# Patient Record
Sex: FEMALE | Race: WHITE | NOT HISPANIC OR LATINO | Employment: FULL TIME | URBAN - NONMETROPOLITAN AREA
[De-identification: names, ages, dates, MRNs, and addresses within clinical notes are randomized per-mention and may not be internally consistent; named-entity substitution may affect disease eponyms.]

---

## 2018-04-30 ENCOUNTER — HOSPITAL ENCOUNTER (EMERGENCY)
Facility: HOSPITAL | Age: 28
Discharge: HOME | End: 2018-04-30
Attending: EMERGENCY MEDICINE | Admitting: EMERGENCY MEDICINE

## 2018-04-30 ENCOUNTER — APPOINTMENT (OUTPATIENT)
Dept: ULTRASOUND IMAGING | Facility: HOSPITAL | Age: 28
End: 2018-04-30

## 2018-04-30 VITALS
OXYGEN SATURATION: 97 % | HEART RATE: 71 BPM | WEIGHT: 133.8 LBS | SYSTOLIC BLOOD PRESSURE: 104 MMHG | DIASTOLIC BLOOD PRESSURE: 55 MMHG | BODY MASS INDEX: 24.62 KG/M2 | TEMPERATURE: 98.6 F | RESPIRATION RATE: 16 BRPM | HEIGHT: 62 IN

## 2018-04-30 DIAGNOSIS — Z32.01 PREGNANCY TEST POSITIVE: ICD-10-CM

## 2018-04-30 DIAGNOSIS — N92.3 VAGINAL BLEEDING BETWEEN PERIODS: Primary | ICD-10-CM

## 2018-04-30 LAB
ABO GROUP BLD: NORMAL
ANION GAP SERPL CALCULATED.3IONS-SCNC: 18 MMOL/L (ref 10–20)
B-HCG UR QL: NEGATIVE
BACTERIA UR QL AUTO: ABNORMAL /HPF
BASOPHILS # BLD AUTO: 0.08 10*3/MM3 (ref 0–0.2)
BASOPHILS NFR BLD AUTO: 1.1 % (ref 0–2.5)
BILIRUB UR QL STRIP: NEGATIVE
BUN BLD-MCNC: 9 MG/DL (ref 7–20)
BUN/CREAT SERPL: 15 (ref 7.1–23.5)
CALCIUM SPEC-SCNC: 9.2 MG/DL (ref 8.4–10.2)
CHLORIDE SERPL-SCNC: 104 MMOL/L (ref 98–107)
CLARITY UR: CLEAR
CO2 SERPL-SCNC: 26 MMOL/L (ref 26–30)
COLOR UR: YELLOW
CREAT BLD-MCNC: 0.6 MG/DL (ref 0.6–1.3)
DEPRECATED RDW RBC AUTO: 39.5 FL (ref 37–54)
EOSINOPHIL # BLD AUTO: 0.12 10*3/MM3 (ref 0–0.7)
EOSINOPHIL NFR BLD AUTO: 1.7 % (ref 0–7)
ERYTHROCYTE [DISTWIDTH] IN BLOOD BY AUTOMATED COUNT: 11.9 % (ref 11.5–14.5)
GFR SERPL CREATININE-BSD FRML MDRD: 119 ML/MIN/1.73
GLUCOSE BLD-MCNC: 109 MG/DL (ref 74–98)
GLUCOSE UR STRIP-MCNC: NEGATIVE MG/DL
HCG INTACT+B SERPL-ACNC: 28.38 MIU/ML
HCG SERPL QL: POSITIVE
HCT VFR BLD AUTO: 35.5 % (ref 37–47)
HGB BLD-MCNC: 11.9 G/DL (ref 12–16)
HGB UR QL STRIP.AUTO: ABNORMAL
HYALINE CASTS UR QL AUTO: ABNORMAL /LPF
IMM GRANULOCYTES # BLD: 0.02 10*3/MM3 (ref 0–0.06)
IMM GRANULOCYTES NFR BLD: 0.3 % (ref 0–0.6)
KETONES UR QL STRIP: NEGATIVE
LEUKOCYTE ESTERASE UR QL STRIP.AUTO: NEGATIVE
LYMPHOCYTES # BLD AUTO: 1.6 10*3/MM3 (ref 0.6–3.4)
LYMPHOCYTES NFR BLD AUTO: 22.6 % (ref 10–50)
MCH RBC QN AUTO: 30.4 PG (ref 27–31)
MCHC RBC AUTO-ENTMCNC: 33.5 G/DL (ref 30–37)
MCV RBC AUTO: 90.8 FL (ref 81–99)
MONOCYTES # BLD AUTO: 0.74 10*3/MM3 (ref 0–0.9)
MONOCYTES NFR BLD AUTO: 10.5 % (ref 0–12)
NEUTROPHILS # BLD AUTO: 4.51 10*3/MM3 (ref 2–6.9)
NEUTROPHILS NFR BLD AUTO: 63.8 % (ref 37–80)
NITRITE UR QL STRIP: NEGATIVE
NRBC BLD MANUAL-RTO: 0 /100 WBC (ref 0–0)
PH UR STRIP.AUTO: 6.5 [PH] (ref 5–8)
PLATELET # BLD AUTO: 261 10*3/MM3 (ref 130–400)
PMV BLD AUTO: 10.5 FL (ref 6–12)
POTASSIUM BLD-SCNC: 4 MMOL/L (ref 3.5–5.1)
PROT UR QL STRIP: NEGATIVE
RBC # BLD AUTO: 3.91 10*6/MM3 (ref 4.2–5.4)
RBC # UR: ABNORMAL /HPF
REF LAB TEST METHOD: ABNORMAL
RH BLD: NEGATIVE
SODIUM BLD-SCNC: 144 MMOL/L (ref 137–145)
SP GR UR STRIP: 1.01 (ref 1–1.03)
SQUAMOUS #/AREA URNS HPF: ABNORMAL /HPF
UROBILINOGEN UR QL STRIP: ABNORMAL
WBC NRBC COR # BLD: 7.07 10*3/MM3 (ref 4.8–10.8)
WBC UR QL AUTO: ABNORMAL /HPF

## 2018-04-30 PROCEDURE — 81001 URINALYSIS AUTO W/SCOPE: CPT | Performed by: EMERGENCY MEDICINE

## 2018-04-30 PROCEDURE — P9612 CATHETERIZE FOR URINE SPEC: HCPCS

## 2018-04-30 PROCEDURE — 96360 HYDRATION IV INFUSION INIT: CPT

## 2018-04-30 PROCEDURE — 99283 EMERGENCY DEPT VISIT LOW MDM: CPT

## 2018-04-30 PROCEDURE — 80048 BASIC METABOLIC PNL TOTAL CA: CPT | Performed by: PHYSICIAN ASSISTANT

## 2018-04-30 PROCEDURE — 81025 URINE PREGNANCY TEST: CPT | Performed by: EMERGENCY MEDICINE

## 2018-04-30 PROCEDURE — 76817 TRANSVAGINAL US OBSTETRIC: CPT

## 2018-04-30 PROCEDURE — 86900 BLOOD TYPING SEROLOGIC ABO: CPT | Performed by: PHYSICIAN ASSISTANT

## 2018-04-30 PROCEDURE — 84702 CHORIONIC GONADOTROPIN TEST: CPT | Performed by: PHYSICIAN ASSISTANT

## 2018-04-30 PROCEDURE — 84703 CHORIONIC GONADOTROPIN ASSAY: CPT | Performed by: PHYSICIAN ASSISTANT

## 2018-04-30 PROCEDURE — 85025 COMPLETE CBC W/AUTO DIFF WBC: CPT | Performed by: PHYSICIAN ASSISTANT

## 2018-04-30 PROCEDURE — 86901 BLOOD TYPING SEROLOGIC RH(D): CPT | Performed by: PHYSICIAN ASSISTANT

## 2018-04-30 RX ORDER — SODIUM CHLORIDE 0.9 % (FLUSH) 0.9 %
10 SYRINGE (ML) INJECTION AS NEEDED
Status: DISCONTINUED | OUTPATIENT
Start: 2018-04-30 | End: 2018-04-30 | Stop reason: HOSPADM

## 2018-04-30 RX ADMIN — SODIUM CHLORIDE 1000 ML: 9 INJECTION, SOLUTION INTRAVENOUS at 12:45

## 2018-05-02 ENCOUNTER — TRANSCRIBE ORDERS (OUTPATIENT)
Dept: ADMINISTRATIVE | Facility: HOSPITAL | Age: 28
End: 2018-05-02

## 2018-05-02 ENCOUNTER — APPOINTMENT (OUTPATIENT)
Dept: LAB | Facility: HOSPITAL | Age: 28
End: 2018-05-02

## 2018-05-02 DIAGNOSIS — N93.9 VAGINAL BLEEDING: Primary | ICD-10-CM

## 2018-05-02 LAB — HCG INTACT+B SERPL-ACNC: 18.14 MIU/ML

## 2018-05-02 PROCEDURE — 84702 CHORIONIC GONADOTROPIN TEST: CPT | Performed by: PHYSICIAN ASSISTANT

## 2018-05-02 PROCEDURE — 36415 COLL VENOUS BLD VENIPUNCTURE: CPT | Performed by: PHYSICIAN ASSISTANT

## 2018-05-03 ENCOUNTER — OFFICE VISIT (OUTPATIENT)
Dept: OBSTETRICS AND GYNECOLOGY | Facility: CLINIC | Age: 28
End: 2018-05-03

## 2018-05-03 VITALS
BODY MASS INDEX: 23.19 KG/M2 | SYSTOLIC BLOOD PRESSURE: 120 MMHG | DIASTOLIC BLOOD PRESSURE: 62 MMHG | HEIGHT: 62 IN | WEIGHT: 126 LBS

## 2018-05-03 DIAGNOSIS — Z32.01 POSITIVE PREGNANCY TEST: Primary | ICD-10-CM

## 2018-05-03 DIAGNOSIS — N93.9 ABNORMAL UTERINE BLEEDING (AUB): ICD-10-CM

## 2018-05-03 PROCEDURE — 99203 OFFICE O/P NEW LOW 30 MIN: CPT | Performed by: MIDWIFE

## 2018-05-03 RX ORDER — FERROUS SULFATE TAB EC 324 MG (65 MG FE EQUIVALENT) 324 (65 FE) MG
324 TABLET DELAYED RESPONSE ORAL
COMMUNITY

## 2018-05-03 NOTE — PROGRESS NOTES
"Subjective   Chief Complaint   Patient presents with   • Abnormal Vaginal Bleeding     seen in ED on 18 for vaginal bleeding after having + UPT, here to follow up, + blood on urine dip, negative UPT, had repeat HCG yesterday      Mary Christie is a 27 y.o. year old  presenting to be seen for F/U from ED.  She had a tubal 2015. She normally has monthly menses, but LMP was 3/18.  On  she had moderate amount of red bleeding and abdominal cramping and went to ED after + UPT at home.  HCG was 28 and then 18 yesterday. TVS showed no evidence of pregnancy.  Bleeding is now very light with no cramping.  She also had nausea associated with this.    History  Past Medical History:   Diagnosis Date   • Anemia    • Migraine    , Past Surgical History:   Procedure Laterality Date   • TUBAL ABDOMINAL LIGATION     , Family History   Problem Relation Age of Onset   • Hypertension Father    • Hyperlipidemia Father    • Stroke Father    • Thyroid disease Mother    • Asthma Mother    • Stroke Mother    • ADD / ADHD Brother    • Stroke Paternal Grandfather    • Stroke Paternal Grandmother    , Social History   Substance Use Topics   • Smoking status: Never Smoker   • Smokeless tobacco: Never Used   • Alcohol use No   ,   (Not in a hospital admission) and Allergies:  Latex    Smoking status: Never Smoker                                                              Smokeless tobacco: Never Used                          Review of Systems  Pertinent items are noted in HPI, all other systems reviewed and negative       Objective   /62   Ht 157.5 cm (62\")   Wt 57.2 kg (126 lb)   LMP 2018   Breastfeeding? No   BMI 23.05 kg/m²     Physical Exam:  General Appearance: alert, appears stated age and cooperative  Neck: suppple, trachea midline and no thyromegaly  Lungs: clear to auscultation, respirations regular, respirations even and respirations unlabored  Heart: regular rhythm and normal rate and no " murmur, gallop, or rubs  Abdomen: no masses and soft non-tender  Skin: color normal and no lesions noted  Neurologic: Mental Status orientated to person, place, time and situation, Speech normal content and execusion    Lab Review   reviewed from ED    Imaging   Reviewed from ED         Assessment /Plan    Mary was seen today for abnormal vaginal bleeding.    Diagnoses and all orders for this visit:    Positive pregnancy test  -     HCG, B-subunit, Quantitative; Future    Abnormal uterine bleeding (AUB)      May take Tylenol/Ibuprofen PRN  Follow up 1 week              This note was electronically signed.  Jeana Springer CNM  5/3/2018

## 2018-05-04 DIAGNOSIS — Z32.01 POSITIVE PREGNANCY TEST: ICD-10-CM

## 2018-05-04 LAB — HCG INTACT+B SERPL-ACNC: 12.01 MIU/ML

## 2018-05-10 DIAGNOSIS — O02.1 MISSED ABORTION: Primary | ICD-10-CM

## 2018-07-17 ENCOUNTER — OFFICE VISIT (OUTPATIENT)
Dept: OBSTETRICS AND GYNECOLOGY | Facility: CLINIC | Age: 28
End: 2018-07-17

## 2018-07-17 VITALS
DIASTOLIC BLOOD PRESSURE: 70 MMHG | HEIGHT: 62 IN | BODY MASS INDEX: 24.66 KG/M2 | WEIGHT: 134 LBS | SYSTOLIC BLOOD PRESSURE: 126 MMHG

## 2018-07-17 DIAGNOSIS — L70.9 ACNE, UNSPECIFIED ACNE TYPE: ICD-10-CM

## 2018-07-17 DIAGNOSIS — N93.9 ABNORMAL UTERINE BLEEDING (AUB): Primary | ICD-10-CM

## 2018-07-17 PROCEDURE — 99213 OFFICE O/P EST LOW 20 MIN: CPT | Performed by: MIDWIFE

## 2018-07-17 NOTE — PROGRESS NOTES
"Subjective   Chief Complaint   Patient presents with   • Amenorrhea     started period last Thursday, previous period was in April, had tubal 2015     Mary Christie is a 27 y.o. year old  presenting to be seen for abnormal uterine bleeding.  She was her in May for possible SAB and didn't have a period in May or .  She started bleeding  and it has almost stopped. This period has been heavy.  She also C/O acne for several months. This occurs throughout the month, not just around her menstrual cycle.  Her PCP seemed to think it was hormonally related.  She has had a tubal 2015.    History  Past Medical History:   Diagnosis Date   • Anemia    • Migraine    ,   Past Surgical History:   Procedure Laterality Date   • TUBAL ABDOMINAL LIGATION     ,   Family History   Problem Relation Age of Onset   • Hypertension Father    • Hyperlipidemia Father    • Stroke Father    • Thyroid disease Mother    • Asthma Mother    • Stroke Mother    • ADD / ADHD Brother    • Stroke Paternal Grandfather    • Stroke Paternal Grandmother    ,   Social History   Substance Use Topics   • Smoking status: Never Smoker   • Smokeless tobacco: Never Used   • Alcohol use No   ,   (Not in a hospital admission) and Allergies:  Latex    Smoking status: Never Smoker                                                              Smokeless tobacco: Never Used                          Review of Systems  Pertinent items are noted in HPI, all other systems reviewed and negative       Objective   /70   Ht 157.5 cm (62\")   Wt 60.8 kg (134 lb)   LMP 2018 (Approximate)   Breastfeeding? No   BMI 24.51 kg/m²     Physical Exam:  General Appearance: alert and cooperative  Skin: color normal and Acne on face, chest, upper arms  Neurologic: Mental Status orientated to person, place, time and situation, Speech normal content and execusion  Psych: normal mood and affect, thought content organized and appropriate " judgment    Lab Review   No data reviewed    Imaging   No data reviewed         Assessment /Plan    Mary was seen today for amenorrhea.    Diagnoses and all orders for this visit:    Abnormal uterine bleeding (AUB)    Acne, unspecified acne type      Will observe menstrual cycles for now. Advised if no menses for 3 months, return to office.  Discussed taking OCs for cycle control and possible improvement in acne.  Aloe Vera gel or topical creams for acne.  Follow up 3 months for annual exam           This note was electronically signed.  Jeana Springer CNM  7/17/2018

## 2018-10-29 ENCOUNTER — TELEPHONE (OUTPATIENT)
Dept: URGENT CARE | Facility: CLINIC | Age: 28
End: 2018-10-29

## 2018-11-05 ENCOUNTER — TRANSCRIBE ORDERS (OUTPATIENT)
Dept: ADMINISTRATIVE | Facility: HOSPITAL | Age: 28
End: 2018-11-05

## 2018-11-05 DIAGNOSIS — R51.9 NONINTRACTABLE HEADACHE, UNSPECIFIED CHRONICITY PATTERN, UNSPECIFIED HEADACHE TYPE: Primary | ICD-10-CM

## 2018-11-13 ENCOUNTER — HOSPITAL ENCOUNTER (OUTPATIENT)
Dept: MRI IMAGING | Facility: HOSPITAL | Age: 28
Discharge: HOME | End: 2018-11-13
Admitting: FAMILY MEDICINE

## 2018-11-13 DIAGNOSIS — R51.9 NONINTRACTABLE HEADACHE, UNSPECIFIED CHRONICITY PATTERN, UNSPECIFIED HEADACHE TYPE: ICD-10-CM

## 2018-11-13 PROCEDURE — 70551 MRI BRAIN STEM W/O DYE: CPT

## 2019-06-06 ENCOUNTER — HOSPITAL ENCOUNTER (OUTPATIENT)
Dept: RADIOLOGY | Facility: HOSPITAL | Age: 29
Discharge: HOME | End: 2019-06-06
Attending: NURSE PRACTITIONER
Payer: MEDICAID

## 2019-06-06 ENCOUNTER — OFFICE VISIT (OUTPATIENT)
Dept: FAMILY MEDICINE | Facility: CLINIC | Age: 29
End: 2019-06-06
Payer: MEDICAID

## 2019-06-06 VITALS
WEIGHT: 135 LBS | TEMPERATURE: 98 F | SYSTOLIC BLOOD PRESSURE: 106 MMHG | BODY MASS INDEX: 24.84 KG/M2 | HEART RATE: 75 BPM | DIASTOLIC BLOOD PRESSURE: 80 MMHG | HEIGHT: 62 IN | OXYGEN SATURATION: 98 %

## 2019-06-06 DIAGNOSIS — S80.10XA MULTIPLE LEG CONTUSIONS, UNSPECIFIED LATERALITY, INITIAL ENCOUNTER: ICD-10-CM

## 2019-06-06 DIAGNOSIS — M25.561 ACUTE PAIN OF RIGHT KNEE: Primary | ICD-10-CM

## 2019-06-06 DIAGNOSIS — M25.561 ACUTE PAIN OF RIGHT KNEE: ICD-10-CM

## 2019-06-06 PROCEDURE — 73562 X-RAY EXAM OF KNEE 3: CPT | Mod: 26,RT,, | Performed by: RADIOLOGY

## 2019-06-06 PROCEDURE — 99999 PR PBB SHADOW E&M-NEW PATIENT-LVL IV: ICD-10-PCS | Mod: PBBFAC,,, | Performed by: NURSE PRACTITIONER

## 2019-06-06 PROCEDURE — 99204 OFFICE O/P NEW MOD 45 MIN: CPT | Mod: S$PBB,,, | Performed by: NURSE PRACTITIONER

## 2019-06-06 PROCEDURE — 99999 PR PBB SHADOW E&M-NEW PATIENT-LVL IV: CPT | Mod: PBBFAC,,, | Performed by: NURSE PRACTITIONER

## 2019-06-06 PROCEDURE — 73562 XR KNEE 3 VIEW RIGHT: ICD-10-PCS | Mod: 26,RT,, | Performed by: RADIOLOGY

## 2019-06-06 PROCEDURE — 99204 OFFICE O/P NEW MOD 45 MIN: CPT | Mod: PBBFAC,25,PO | Performed by: NURSE PRACTITIONER

## 2019-06-06 PROCEDURE — 99204 PR OFFICE/OUTPT VISIT, NEW, LEVL IV, 45-59 MIN: ICD-10-PCS | Mod: S$PBB,,, | Performed by: NURSE PRACTITIONER

## 2019-06-06 PROCEDURE — 73562 X-RAY EXAM OF KNEE 3: CPT | Mod: TC,FY,PO,RT

## 2019-06-06 RX ORDER — NAPROXEN 500 MG/1
500 TABLET ORAL
Qty: 60 TABLET | Refills: 0 | Status: SHIPPED | OUTPATIENT
Start: 2019-06-06 | End: 2019-06-12

## 2019-06-06 NOTE — PROGRESS NOTES
Roselia Whittington is a 28 y.o. female patient of  Primary Doctor Vesna who presents to the clinic today for   Chief Complaint   Patient presents with    Fall    Knee Pain     bilateral   .    HPI      Pt, who is not known to me, reports a new problem to me:  Tripped on a box in the lobby and landed on both knees while holding her daughter.  Right leg is worse than left.    These symptoms began 1 hr ago and status is unchanged.     Pt denies the following symptoms:  Walking since she fell.    Aggravating factors include nothing .    Relieving factors include nothing .    OTC Medications tried are nothing.    Prescription medications taken for symptoms are nothing.    Pertinent medical history:  No h/o knee problems in the past    ROS    All other ROS neg.    MS:  See Chief Complaint/HPI      PAST MEDICAL HISTORY:  Past Medical History:   Diagnosis Date    Iron deficiency     Migraine headache        PAST SURGICAL HISTORY:  Past Surgical History:   Procedure Laterality Date    TUBAL LIGATION  2015       SOCIAL HISTORY:  Social History     Socioeconomic History    Marital status: Single     Spouse name: Not on file    Number of children: Not on file    Years of education: Not on file    Highest education level: Not on file   Occupational History    Not on file   Social Needs    Financial resource strain: Not on file    Food insecurity:     Worry: Not on file     Inability: Not on file    Transportation needs:     Medical: Not on file     Non-medical: Not on file   Tobacco Use    Smoking status: Never Smoker    Smokeless tobacco: Never Used   Substance and Sexual Activity    Alcohol use: Yes     Comment: Occasionally     Drug use: No    Sexual activity: Yes     Partners: Male   Lifestyle    Physical activity:     Days per week: Not on file     Minutes per session: Not on file    Stress: Not on file   Relationships    Social connections:     Talks on phone: Not on file     Gets together: Not on file      Attends Gnosticist service: Not on file     Active member of club or organization: Not on file     Attends meetings of clubs or organizations: Not on file     Relationship status: Not on file   Other Topics Concern    Not on file   Social History Narrative    Not on file       FAMILY HISTORY:  History reviewed. No pertinent family history.    ALLERGIES AND MEDICATIONS: updated and reviewed.  Review of patient's allergies indicates:   Allergen Reactions    Insect venom Anaphylaxis     Yellow jackets and hornets    Latex, natural rubber Hives     No current outpatient medications on file.     No current facility-administered medications for this visit.          PHYSICAL EXAM    Alert, coop 28 y.o. female patient in no acute distress.    Vitals:    06/06/19 1118   BP: 106/80   Pulse: 75   Temp: 98 °F (36.7 °C)     VS reviewed.  VS stable.  CC, nursing note, medications & PMH all reviewed today.    Head:  Normocephalic, atraumatic.    EENT: Ext nose/ears normal.  Eyes with normal lids, not injected.           Resp:  Respirations even, unlabored.     Heart:  Normal rate.  CV:  Cap RF brisk, post tib pulses 2+ bilat.      MS:  The ant knees between the tibial tuberosities and the patellae lat to midline of the bilat LEs is/are noted to have + (R>L but both small localized areas) edema, no erythema, right with small 1.5 cm diam ecchymosis.  The affected area on the right knee is tender to palp.  Pain elicited with palp, weight bearing (pt declined to walk after the incident), ROM of the right knee while sitting .  Right ant joint(s) tender to palp.  ROM of the affected area is limited by pain.  Anterior drawer is neg bilat.  Post drawer is neg bilat.  There is no knee joint line tenderness or swelling laterally and no popliteal area tenderness.  The ant thighs and lower legs (ant and post) are NT to palp.  Strength with flexion of knees and hips vs resistance and extention of knees against resistance is intact bilat and  symmetrical.            NEURO:  Alert and oriented x 4.  Responds appropriately during interaction.                  Sensation to light touch intact over LEs bilat.                  Patellar DTRs not tested due to location of the injuries being at the area of pain.    Skin:  Warm, dry, color good.    Psych:  Responds appropriately throughout the visit.               Relaxed.  Well-groomed.    Acute pain of right knee  -     X-Ray Knee 3 View Right; Future; Expected date: 06/06/2019  -     naproxen (NAPROSYN) 500 MG tablet; Take 1 tablet (500 mg total) by mouth after meals as needed.  Dispense: 60 tablet; Refill: 0    Multiple leg contusions, unspecified laterality, initial encounter  Comments:  2 total, 1 each ant knee  Orders:  -     X-Ray Knee 3 View Right; Future; Expected date: 06/06/2019      Pt today presents with bilat ant knee pain after tripping on a box and falling in the lobby while holding her 3 yr old daughter.  She is now experiencing significant right knee pain and mild left knee pain with contusions to both knees.  On exam the left knee has a mild contusion and slight tenderness to pain.  The right knee has a small ecchymotic area and is very tender with palp or any ROM.  She declined to walk r/t the pain.  Ice was applied immediately.  She has good function in the bilat knees and, except for the ecchymosis and pain wit ROM and wt bearing, her exam is normal/neg.    This is a new problem to me and the following work up is planned.    Lab & Radiological Tests Ordered: right knee xray.  The results are pending.    She works at a day care.  A note is given to be off work until Monday.  Pt advised to perform comfort measures recommended on patient instruction sheet .    If not better in 7 days, the patient is advised to f/u.  If worse or concerns, the patient is advised to call.  Explained exam findings, diagnosis and treatment plan to patient and her father, present during the visit.  Questions answered  and patient states understanding.

## 2019-06-06 NOTE — LETTER
June 6, 2019    Roselia Whittington  [address] [facility] [address]         [facility]  [address] [facility] [address] [place] [address]  Phone: [phone]  Fax: [phone] June 6, 2019     Patient: Roselia Whittington   YOB: 1990   Date of Visit: 6/6/2019       To Whom It May Concern:    It is my medical opinion that Roselia Whittington may return to work on [employer] 10, 2019..    If you have any questions or concerns, please don't hesitate to call.    Sincerely,        Una Hansen, NP

## 2019-06-06 NOTE — PATIENT INSTRUCTIONS
Your exam and symptoms today are consistent with right knee pain and contusions on both knees    Use Naproxen tablets for inflammation and pain.  Take them twice daily for 5-7 days then twice daily as needed    Get the right knee xray and we'll call with the result .    Ice and rest for 2 days then advance activity as tolerated.    Comfort measures:    Muscle/joint rubs like Biofreeze, Azeem Lagunas, Aspercreme, Salonpas or generic.  Ask the pharmacist for assistance in choosing the generic equivalent at that store.    Activity as tolerated.    Knee support may also be helpful    If you are not better in 7 days, if worse or you have concerns or questions, please do not hesitate to call.  You can reach us at [phone] Monday through Thursday (except holidays) 10 a.m. to 5 p.m.  I'll be back in the office next Tuesday, June 11.    Evenings and weekends [facility] is also available if symptoms worsen or fail to improve.  When you call the number, a registered nurse answers and takes your information.  The nurse can look at your medical record and make recommendations or call the on call physician, if warranted.      Urgent Cares associated with [facility] are open M-F evenings and on the weekends, as well.    [facility] Address: [address] [place] [address] [place] [address] Phone: [phone]    [facility] Address:  Address: [address] [place] [address] [place] [address] Phone: [phone]    Thank you for using the [facility]!    KILEY Hassan, CNP, FNP-BC  [facility]

## 2019-06-11 ENCOUNTER — TELEPHONE (OUTPATIENT)
Dept: FAMILY MEDICINE | Facility: CLINIC | Age: 29
End: 2019-06-11

## 2019-06-11 NOTE — TELEPHONE ENCOUNTER
----- Message from Shanthi Anand sent at 6/11/2019 10:59 AM CDT -----  Contact: pt  Pt was seen 6/6 and wanting the results of her xray done,also pt is complaining her right knee is still hurting her...[phone]

## 2019-06-11 NOTE — TELEPHONE ENCOUNTER
Please call the pt and let her know the Xray showed no problems.  We will arrange for an ortho appointment here, however, since she's continuing to have pain.  Our , Sonam Burciaga, will call her to set up the appt.  Thank you!

## 2019-06-12 ENCOUNTER — OFFICE VISIT (OUTPATIENT)
Dept: ORTHOPEDICS | Facility: CLINIC | Age: 29
End: 2019-06-12
Payer: MEDICAID

## 2019-06-12 VITALS
SYSTOLIC BLOOD PRESSURE: 138 MMHG | WEIGHT: 135 LBS | HEIGHT: 62 IN | DIASTOLIC BLOOD PRESSURE: 80 MMHG | BODY MASS INDEX: 24.84 KG/M2 | HEART RATE: 93 BPM

## 2019-06-12 DIAGNOSIS — M23.91 INTERNAL DERANGEMENT OF RIGHT KNEE: Primary | ICD-10-CM

## 2019-06-12 PROCEDURE — 99999 PR PBB SHADOW E&M-EST. PATIENT-LVL III: CPT | Mod: PBBFAC,,, | Performed by: NURSE PRACTITIONER

## 2019-06-12 PROCEDURE — 99213 PR OFFICE/OUTPT VISIT, EST, LEVL III, 20-29 MIN: ICD-10-PCS | Mod: S$PBB,,, | Performed by: NURSE PRACTITIONER

## 2019-06-12 PROCEDURE — 99999 PR PBB SHADOW E&M-EST. PATIENT-LVL III: ICD-10-PCS | Mod: PBBFAC,,, | Performed by: NURSE PRACTITIONER

## 2019-06-12 PROCEDURE — 99213 OFFICE O/P EST LOW 20 MIN: CPT | Mod: S$PBB,,, | Performed by: NURSE PRACTITIONER

## 2019-06-12 PROCEDURE — 99213 OFFICE O/P EST LOW 20 MIN: CPT | Mod: PBBFAC,PN | Performed by: NURSE PRACTITIONER

## 2019-06-12 RX ORDER — TRAMADOL HYDROCHLORIDE 50 MG/1
50 TABLET ORAL EVERY 8 HOURS PRN
Qty: 22 TABLET | Refills: 0 | Status: SHIPPED | OUTPATIENT
Start: 2019-06-12

## 2019-06-12 RX ORDER — MELOXICAM 7.5 MG/1
7.5 TABLET ORAL DAILY
Qty: 20 TABLET | Refills: 0 | Status: SHIPPED | OUTPATIENT
Start: 2019-06-12

## 2019-06-12 NOTE — PROGRESS NOTES
Chief Complaint   Patient presents with    Right Knee - Pain         HPI:   This is a 28 y.o. who presents to clinic today complaining of right knee pain for 6 days after fall over mail basket striking bilateral knees on ground. Pain is progressively worsening, no relief with Naproxen, knee brace, ice or elevation. States pain radiates up to right hip and down to right ankle with attempted ambulation. Only able to tolerate toe touch weightbearing due to pain.  No numbness or tingling.    Past Medical History:   Diagnosis Date    Iron deficiency     Migraine headache      Past Surgical History:   Procedure Laterality Date    TUBAL LIGATION  2015     Current Outpatient Medications on File Prior to Visit   Medication Sig Dispense Refill    [DISCONTINUED] naproxen (NAPROSYN) 500 MG tablet Take 1 tablet (500 mg total) by mouth after meals as needed. 60 tablet 0     No current facility-administered medications on file prior to visit.      Review of patient's allergies indicates:   Allergen Reactions    Insect venom Anaphylaxis     Yellow jackets and hornets    Latex, natural rubber Hives     History reviewed. No pertinent family history.  Social History     Socioeconomic History    Marital status: Single     Spouse name: Not on file    Number of children: Not on file    Years of education: Not on file    Highest education level: Not on file   Occupational History    Not on file   Social Needs    Financial resource strain: Not on file    Food insecurity:     Worry: Not on file     Inability: Not on file    Transportation needs:     Medical: Not on file     Non-medical: Not on file   Tobacco Use    Smoking status: Never Smoker    Smokeless tobacco: Never Used   Substance and Sexual Activity    Alcohol use: Yes     Comment: Occasionally     Drug use: No    Sexual activity: Yes     Partners: Male   Lifestyle    Physical activity:     Days per week: Not on file     Minutes per session: Not on file     Stress: Not on file   Relationships    Social connections:     Talks on phone: Not on file     Gets together: Not on file     Attends Zoroastrianism service: Not on file     Active member of club or organization: Not on file     Attends meetings of clubs or organizations: Not on file     Relationship status: Not on file   Other Topics Concern    Not on file   Social History Narrative    Not on file       Review of Systems:  Constitutional:  Denies fever or chills   Eyes:  Denies change in visual acuity   HENT:  Denies nasal congestion or sore throat   Respiratory:  Denies cough or shortness of breath   Cardiovascular:  Denies chest pain or edema   GI:  Denies abdominal pain, nausea, vomiting, bloody stools or diarrhea   :  Denies dysuria   Integument:  Denies rash   Neurologic:  Denies headache, focal weakness or sensory changes   Endocrine:  Denies polyuria or polydipsia   Lymphatic:  Denies swollen glands   Psychiatric:  Denies depression or anxiety     Physical Exam:   Constitutional:  Well developed, well nourished, no acute distress, non-toxic appearance   Integument:  Well hydrated, no rash   Lymphatic:  No lymphadenopathy noted   Neurologic:  Alert & oriented x 3   Psychiatric:  Speech and behavior appropriate         Right Hip Exam     Tenderness   The patient is experiencing no tenderness     Range of Motion   The patient has normal hip ROM.    Muscle Strength   Abduction: 4/5     Other   Erythema: absent  Sensation: normal  Pulse: present    Left Hip Exam   Hip exam performed same as contralateral hip and is normal.           Right Knee Exam     Skin intact, mild medially bruising    Tenderness   The patient is experiencing diffuse tenderness     Range of Motion   0-45    Muscle Strength     The patient has normal knee strength.    Tests   Gray:  Medial - positive   Lachman:  Anterior - negative      Varus: negative  Valgus: negative  Patellar Apprehension: negative    Other   Erythema:  absent  Sensation: normal  Pulse: present  Swelling: mild      Left Knee Exam   Skin intact, mild medially bruising    Tenderness   The patient is experiencing no tenderness    Range of Motion   0-120    Muscle Strength     The patient has normal knee strength.    Tests   Gray:  Medial - negative  Lachman:  Anterior - negative      Varus: negative  Valgus: negative  Patellar Apprehension: negative    Other   Erythema: absent  Sensation: normal  Pulse: present  Swelling: none    Right ankle    Swelling: none  Tenderness: none    Range of Motion    Dorsiflexion: normal    Plantar flexion: normal        Tests    Anterior drawer: negative   Varus tilt: negative       Other    Erythema: absent   Sensation: normal   Pulse: present     Left ankle  Exam performed same as contralateral side and is normal        X-rays were performed last week, personally reviewed by me and findings discussed with the patient.  3 views of the right knee show no acute fracture or dislocation    Internal derangement of right knee  -     MRI Knee Without Contrast Right; Future; Expected date: 06/12/2019    Other orders  -     meloxicam (MOBIC) 7.5 MG tablet; Take 1 tablet (7.5 mg total) by mouth once daily.  Dispense: 20 tablet; Refill: 0  -     traMADol (ULTRAM) 50 mg tablet; Take 1 tablet (50 mg total) by mouth every 8 (eight) hours as needed for Pain.  Dispense: 22 tablet; Refill: 0        Mobic and Ultram ordered. Continue ice and elevation. MRI right knee scheduled.  RTC 1 week with Dr. Bond.

## 2019-06-12 NOTE — LETTER
June 12, 2019      Una Hansen NP  [address] [facility] [address] [place] [address]           [facility]  [address] [facility] [address] [place] [address]  Phone: [phone]          Patient: Roselia Whittington   MR Number: [id number]   YOB: 1990   Date of Visit: 6/12/2019       Dear Una Hansen:    Thank you for referring Roselia Whittington to me for evaluation. Attached you will find relevant portions of my assessment and plan of care.    If you have questions, please do not hesitate to call me. I look forward to following Roselia Whittington along with you.    Sincerely,    Pam Ren NP    Enclosure  CC:  No Recipients    If you would like to receive this communication electronically, please contact [email or web address] or [phone] to request more information on [employer] Link access.    For providers and/or their staff who would like to refer a patient to [facility], please contact us through our one-stop-shop provider referral line, [facility], at [phone].    If you feel you have received this communication in error or would no longer like to receive these types of communications, please e-mail [email or web address]

## 2019-06-15 ENCOUNTER — HOSPITAL ENCOUNTER (OUTPATIENT)
Dept: RADIOLOGY | Facility: HOSPITAL | Age: 29
Discharge: HOME | End: 2019-06-15
Attending: NURSE PRACTITIONER
Payer: MEDICAID

## 2019-06-15 DIAGNOSIS — M23.91 INTERNAL DERANGEMENT OF RIGHT KNEE: ICD-10-CM

## 2019-06-15 PROCEDURE — 73721 MRI JNT OF LWR EXTRE W/O DYE: CPT | Mod: 26,RT,, | Performed by: RADIOLOGY

## 2019-06-15 PROCEDURE — 73721 MRI JNT OF LWR EXTRE W/O DYE: CPT | Mod: TC,PO,RT

## 2019-06-15 PROCEDURE — 73721 MRI KNEE WITHOUT CONTRAST RIGHT: ICD-10-PCS | Mod: 26,RT,, | Performed by: RADIOLOGY

## 2019-06-25 ENCOUNTER — OFFICE VISIT (OUTPATIENT)
Dept: ORTHOPEDICS | Facility: CLINIC | Age: 29
End: 2019-06-25
Payer: MEDICAID

## 2019-06-25 VITALS
WEIGHT: 135 LBS | SYSTOLIC BLOOD PRESSURE: 125 MMHG | BODY MASS INDEX: 24.84 KG/M2 | HEIGHT: 62 IN | DIASTOLIC BLOOD PRESSURE: 81 MMHG | HEART RATE: 102 BPM

## 2019-06-25 DIAGNOSIS — M76.51 JUMPER'S KNEE OF RIGHT SIDE: Primary | ICD-10-CM

## 2019-06-25 PROCEDURE — 99214 OFFICE O/P EST MOD 30 MIN: CPT | Mod: ,,, | Performed by: ORTHOPAEDIC SURGERY

## 2019-06-25 PROCEDURE — 99999 PR PBB SHADOW E&M-EST. PATIENT-LVL III: CPT | Mod: PBBFAC,,, | Performed by: ORTHOPAEDIC SURGERY

## 2019-06-25 PROCEDURE — 99999 PR PBB SHADOW E&M-EST. PATIENT-LVL III: ICD-10-PCS | Mod: PBBFAC,,, | Performed by: ORTHOPAEDIC SURGERY

## 2019-06-25 PROCEDURE — 99214 PR OFFICE/OUTPT VISIT, EST, LEVL IV, 30-39 MIN: ICD-10-PCS | Mod: ,,, | Performed by: ORTHOPAEDIC SURGERY

## 2019-06-25 RX ORDER — MELOXICAM 15 MG/1
15 TABLET ORAL DAILY
Qty: 30 TABLET | Refills: 11 | Status: SHIPPED | OUTPATIENT
Start: 2019-06-25

## 2019-06-25 NOTE — PROGRESS NOTES
Chief Complaint   Patient presents with    Knee Pain     right knee pain from fall on 6/6/19 : MRI results       HPI:   This is a 28 y.o. who returns to clinic today in follow-up for right knee pain for the past 2 weeks after fall. Pain is throbbing. No numbness or tingling. No associated signs or symptoms.    Past Medical History:   Diagnosis Date    Iron deficiency     Migraine headache      Past Surgical History:   Procedure Laterality Date    TUBAL LIGATION  2015     Current Outpatient Medications on File Prior to Visit   Medication Sig Dispense Refill    meloxicam (MOBIC) 7.5 MG tablet Take 1 tablet (7.5 mg total) by mouth once daily. 20 tablet 0    traMADol (ULTRAM) 50 mg tablet Take 1 tablet (50 mg total) by mouth every 8 (eight) hours as needed for Pain. 22 tablet 0     No current facility-administered medications on file prior to visit.      Review of patient's allergies indicates:   Allergen Reactions    Insect venom Anaphylaxis     Yellow jackets and hornets    Latex, natural rubber Hives     History reviewed. No pertinent family history.  Social History     Socioeconomic History    Marital status: Single     Spouse name: Not on file    Number of children: Not on file    Years of education: Not on file    Highest education level: Not on file   Occupational History    Not on file   Social Needs    Financial resource strain: Not on file    Food insecurity:     Worry: Not on file     Inability: Not on file    Transportation needs:     Medical: Not on file     Non-medical: Not on file   Tobacco Use    Smoking status: Never Smoker    Smokeless tobacco: Never Used   Substance and Sexual Activity    Alcohol use: Yes     Comment: Occasionally     Drug use: No    Sexual activity: Yes     Partners: Male   Lifestyle    Physical activity:     Days per week: Not on file     Minutes per session: Not on file    Stress: Not on file   Relationships    Social connections:     Talks on phone: Not on  file     Gets together: Not on file     Attends [employer] service: Not on file     Active member of club or organization: Not on file     Attends meetings of clubs or organizations: Not on file     Relationship status: Not on file   Other Topics Concern    Not on file   Social History Narrative    Not on file       Review of Systems:  Constitutional:  Denies fever or chills   Eyes:  Denies change in visual acuity   HENT:  Denies nasal congestion or sore throat   Respiratory:  Denies cough or shortness of breath   Cardiovascular:  Denies chest pain or edema   GI:  Denies abdominal pain, nausea, vomiting, bloody stools or diarrhea   :  Denies dysuria   Integument:  Denies rash   Neurologic:  Denies headache, focal weakness or sensory changes   Endocrine:  Denies polyuria or polydipsia   Lymphatic:  Denies swollen glands   Psychiatric:  Denies depression or anxiety   Physical Exam:   Constitutional:  Well developed, well nourished, no acute distress, non-toxic appearance   Integument:  Well hydrated, no rash   Lymphatic:  No lymphadenopathy noted   Neurologic:  Alert & oriented x 3, CN 2-12 normal, normal motor function, normal sensory function, no focal deficits noted   Psychiatric:  Speech and behavior appropriate     L knee  .ladsame    R knee  Point TTP noted about the inferior pole patella. Stable to stress. Skin intact. Compartments soft. NVI distally.     MRI was performed today, personally reviewed by me and findings discussed with the patient.  Increased signal intensity about the inferior pole patella.       Jumper's knee of right side  -     Ambulatory referral to Physical Therapy    Other orders  -     meloxicam (MOBIC) 15 MG tablet; Take 1 tablet (15 mg total) by mouth once daily.  Dispense: 30 tablet; Refill: 11       Begin mobic and PT. Patellar strap. RTC 2 months.

## 2019-07-01 ENCOUNTER — PATIENT MESSAGE (OUTPATIENT)
Dept: ORTHOPEDICS | Facility: CLINIC | Age: 29
End: 2019-07-01

## 2019-07-02 ENCOUNTER — CLINICAL SUPPORT (OUTPATIENT)
Dept: REHABILITATION | Facility: HOSPITAL | Age: 29
End: 2019-07-02
Attending: ORTHOPAEDIC SURGERY
Payer: MEDICAID

## 2019-07-02 DIAGNOSIS — R26.89 IMPAIRED GAIT AND MOBILITY: ICD-10-CM

## 2019-07-02 PROCEDURE — 97161 PT EVAL LOW COMPLEX 20 MIN: CPT | Mod: PO | Performed by: PHYSICAL THERAPIST

## 2019-07-02 NOTE — PLAN OF CARE
[facility]  Physical Therapy Initial Evaluation    Name: Roselia Whittington  Clinic Number: [id number]    Therapy Diagnosis:   Encounter Diagnosis   Name Primary?    Impaired gait and mobility      Physician: Richard Bond MD    Physician Orders: PT Eval and Treat   Medical Diagnosis from Referral: M76.51 (ICD-10-CM) - Jumper's knee of right side  Evaluation Date: 7/2/2019  Authorization Period Expiration: awaiting confirmation from financial services  Plan of Care Expiration: 08/30/30  Visit # / Visits authorized: 1/ 1    Time In: 1522  Time Out: 1605  Total Billable Time: 43 minutes    Precautions: Standard    Subjective   Date of onset: about 3 weeks ago  History of current condition - Roselia reports: pt was at an appt for her 3 yo daughter. She tripped and fell over mail carton. She was holding her 3 yo and landed on both knees R>L due to holding daughter on that side. She denies knee pain prior, but states that she runs into things a lot. She is having trouble walking, bending R knee due to shooting pain, kneeling, bathing, and using restroom.     Medical History:   Past Medical History:   Diagnosis Date    Iron deficiency     Migraine headache        Surgical History:   Roselia Whittington  has a past surgical history that includes Tubal ligation (2015).    Medications:   Roselia has a current medication list which includes the following prescription(s): meloxicam, meloxicam, and tramadol.    Allergies:   Review of patient's allergies indicates:   Allergen Reactions    Insect venom Anaphylaxis     Yellow jackets and hornets    Latex, natural rubber Hives        Imaging, xray 6/6/19: No acute fracture or dislocation.  No appreciable degenerative change or erosive change.  No joint effusion right knee    MRI 6/15/19: 1. Increased T2 signal within the subcutaneous tissues superficial to the distal patellar tendon with some fluid signal intensity posterior to the patellar tendon at the tibial  insertion.  This could relate to patellar tendinopathy or prepatellar bursal irritation or contusion.  No osseous edema is noted to suggest Osgood Schlatter type injury.  2. Mild increased T2 signal in the quadriceps tendon at the patellar insertion suggestive of tendinopathy.  3. Slight irregularity to the under surface of the posterior horn body of the medial meniscus suggestive of possible tear/fraying.  This is not definitively a tear and appears as a contour irregularity    Prior Therapy: none  Social History:  lives with their family  Occupation:   Prior Level of Function: (I) with all ADLs  Current Level of Function: Mod (I) with walking TTWB. She reports she was not instructed how much WB.    Pain:  Current 5/10, worst 5/10, best 5/10   Location: right knee   Description: Aching and Sharp  Aggravating Factors: Walking  Easing Factors: rest and elevation    Pts goals: return to walking, working, and caring for child    Objective       Observation: ambulating with B axillary crutches, TTWB, ecchymosis L >R, patellar strap on R patellar t.    Knee AROM:  R: -28 to 65 deg  L: 0 to 130 deg    Lower Extremity Strength  Right LE  Left LE    Knee extension: 4+/5 Knee extension: 5/5   Knee flexion: 3+/5 Knee flexion: 5/5   Hip flexion: 4-/5 Hip flexion: 5/5                     Hip abduction: 4-/5 Hip abduction: 5/5   Hip adduction: 3+/5 Hip adduction 4/5     Joint Mobility: hypomobile patellar all directions    Palpation: pos R patellar tendon for pain, neg med and lat ant meniscus    Edema: pre patellar: R/L (34.5/34cm), joint line: (34/34), inferior patellar (31/31); R ANKLE FIG 8: 53.0, L 50.5 CM    Special tests:  Valgus: neg  Varus: pain, neg laxity   Mccrary: guarding, pain reported  Ant drawer: pain    CMS Impairment/Limitation/Restriction for FOTO KNEE Survey    Therapist reviewed FOTO scores for Roselia Whittington on 7/2/2019.   FOTO documents entered into EPIC - see Media  section.    Limitation Score: 74%       Home Exercises and Patient Education Provided    Education provided:   - Insurance approval process  -wait for call back and call by end of week if you have not heard back    Written Home Exercises Provided: no.  Exercises were reviewed and Roselia was able to demonstrate them prior to the end of the session.  Roselia demonstrated n/a understanding of the education provided.     See EMR under n/a for exercises provided 7/2/2019.    Assessment   Roselia is a 28 y.o. female referred to outpatient Physical Therapy with a medical diagnosis of M76.51 (ICD-10-CM) - Jumper's knee of right side. Pt presents with R knee and R ankle edema, impaired R LE strength, impaired R knee ROM, and impaired ADLs.    Pt prognosis is Good.   Pt will benefit from skilled outpatient Physical Therapy to address the deficits stated above and in the chart below, provide pt/family education, and to maximize pt's level of independence.     Plan of care discussed with patient: Yes  Pt's spiritual, cultural and educational needs considered and patient is agreeable to the plan of care and goals as stated below:     Anticipated Barriers for therapy: none    Medical Necessity is demonstrated by the following  History  Co-morbidities and personal factors that may impact the plan of care Co-morbidities:   none    Personal Factors:   no deficits     low   Examination  Body Structures and Functions, activity limitations and participation restrictions that may impact the plan of care Body Regions:   lower extremities    Body Systems:    ROM  strength  gait  motor control  motor learning    Participation Restrictions:   Impaired play with child, impaired mobility for work    Activity limitations:   Learning and applying knowledge  no deficits    General Tasks and Commands  no deficits    Communication  no deficits    Mobility  lifting and carrying objects  walking  driving (bike, car, motorcycle)    Self  care  toileting    Domestic Life  shopping  cooking  doing house work (cleaning house, washing dishes, laundry)  assisting others    Interactions/Relationships  no deficits    Life Areas  no deficits    Community and Social Life  community life         high   Clinical Presentation evolving clinical presentation with changing clinical characteristics moderate   Decision Making/ Complexity Score: moderate     Goals:  Short Term Goals: 4 weeks   -Pt will demo R knee AROM ext to -15 deg to improve gt.  -Pt will demo R knee AROM flex to 90 deg to assist toward return to kneeling.  -Pt will ambulate WBAT with/without Ads.    Long Term Goals: 8 weeks   -Improve FOTO impairment score to 43% for return to work as .    Plan   Plan of care Certification: 7/2/2019 to 08/30/2019.    Outpatient Physical Therapy 2 times weekly for 8 weeks to include the following interventions: Electrical Stimulation IFC, Gait Training, Manual Therapy, Moist Heat/ Ice, Neuromuscular Re-ed, Patient Education, Self Care, Therapeutic Activites and Therapeutic Exercise.     Wanda Lawrence, PT

## 2019-07-22 ENCOUNTER — DOCUMENTATION ONLY (OUTPATIENT)
Dept: REHABILITATION | Facility: HOSPITAL | Age: 29
End: 2019-07-22

## 2019-07-24 ENCOUNTER — DOCUMENTATION ONLY (OUTPATIENT)
Dept: REHABILITATION | Facility: HOSPITAL | Age: 29
End: 2019-07-24

## 2019-07-29 ENCOUNTER — DOCUMENTATION ONLY (OUTPATIENT)
Dept: REHABILITATION | Facility: HOSPITAL | Age: 29
End: 2019-07-29

## 2019-07-29 PROBLEM — R26.89 IMPAIRED GAIT AND MOBILITY: Status: RESOLVED | Noted: 2019-07-02 | Resolved: 2019-07-29

## 2019-07-29 NOTE — PROGRESS NOTES
Outpatient Therapy Discharge Summary     Name: Roselia Whittington  [facility] Number: [id number]    Therapy Diagnosis:        Encounter Diagnosis   Name Primary?    Impaired gait and mobility        Physician: Richard Bond MD     Physician Orders: PT Eval and Treat   Medical Diagnosis from Referral: M76.51 (ICD-10-CM) - Jumper's knee of right side  Evaluation Date: 7/2/2019    Date of Last visit: 07/02/2019  Total Visits Received: 1  Cancelled Visits: 0  No Show Visits: 0    Assessment    Goals: unable to achieved and assess goals due to eval only. Pt set up appts with scheduling and did not attend those appts after eval. 3 missed appts. Discharged.    Discharge reason: Patient has not attended therapy since 07/02/2019.    Plan   This patient is discharged from Physical Therapy

## 2021-05-06 ENCOUNTER — PATIENT MESSAGE (OUTPATIENT)
Dept: RESEARCH | Facility: HOSPITAL | Age: 31
End: 2021-05-06

## 2022-11-22 ENCOUNTER — TELEPHONE (OUTPATIENT)
Dept: PEDIATRICS | Facility: CLINIC | Age: 32
End: 2022-11-22
Payer: MEDICAID

## 2022-11-22 NOTE — TELEPHONE ENCOUNTER
----- Message from Akila Benitez sent at 11/22/2022  1:53 PM CST -----  Type: Needs Medical Advice  Who Called:  pt   Symptoms (please be specific):  said she need to make 3 well visits and 1 med refill for her kids--please call and advise     Best Call Back Number: [phone]      Additional Information: thank you